# Patient Record
Sex: MALE | Race: WHITE | Employment: FULL TIME | URBAN - METROPOLITAN AREA
[De-identification: names, ages, dates, MRNs, and addresses within clinical notes are randomized per-mention and may not be internally consistent; named-entity substitution may affect disease eponyms.]

---
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
## 2018-03-21 ENCOUNTER — OFFICE VISIT (OUTPATIENT)
Dept: FAMILY MEDICINE CLINIC | Facility: CLINIC | Age: 18
End: 2018-03-21

## 2018-03-21 VITALS
BODY MASS INDEX: 32.49 KG/M2 | HEIGHT: 67 IN | WEIGHT: 207 LBS | DIASTOLIC BLOOD PRESSURE: 72 MMHG | SYSTOLIC BLOOD PRESSURE: 151 MMHG | HEART RATE: 74 BPM | TEMPERATURE: 98 F

## 2018-03-21 DIAGNOSIS — F12.90 MARIJUANA USE: ICD-10-CM

## 2018-03-21 DIAGNOSIS — R03.0 ELEVATED BLOOD PRESSURE READING IN OFFICE WITHOUT DIAGNOSIS OF HYPERTENSION: ICD-10-CM

## 2018-03-21 DIAGNOSIS — J06.9 VIRAL UPPER RESPIRATORY TRACT INFECTION: Primary | ICD-10-CM

## 2018-03-21 PROCEDURE — 99214 OFFICE O/P EST MOD 30 MIN: CPT | Performed by: NURSE PRACTITIONER

## 2018-03-21 NOTE — PROGRESS NOTES
HPI  Pt here for cough the other day with large mucous plug. Had some sob and wheezing. Does not have h/o asthma. Smokes marijuana regularly. No cough, congestion, headache, rhinorrhea.    State he got a speeding ticket on way to office  Review of Systems Unknown  Penicillins             Unknown    Physical Exam   Constitutional: He is oriented to person, place, and time. He appears well-developed and well-nourished. No distress. HENT:   Head: Normocephalic and atraumatic.    Right Ear: Hearing, tympanic m disease and cancer  3. Advise to drive safely, within limits and if he is going to be late to an appt, to call rather than speed        Discussed plan of care with pt and pt is in agreement. All questions answered. Pt to call with questions or concerns.

## 2018-03-24 ENCOUNTER — CHARTING TRANS (OUTPATIENT)
Dept: OTHER | Age: 18
End: 2018-03-24

## 2018-03-31 ENCOUNTER — NURSE TRIAGE (OUTPATIENT)
Dept: OTHER | Age: 18
End: 2018-03-31

## 2018-03-31 NOTE — TELEPHONE ENCOUNTER
Action Requested: Summary for Provider     []  Critical Lab, Recommendations Needed  [] Need Additional Advice  []   FYI    []   Need Orders  [] Need Medications Sent to Pharmacy  []  Other     SUMMARY: Received call from patient who reports he went to the

## 2018-04-01 ENCOUNTER — HOSPITAL (OUTPATIENT)
Dept: OTHER | Age: 18
End: 2018-04-01
Attending: EMERGENCY MEDICINE

## 2018-04-01 LAB
ALBUMIN SERPL-MCNC: 4.4 GM/DL (ref 3.6–5.1)
ALBUMIN/GLOB SERPL: 1.2 {RATIO} (ref 1–2.4)
ALP SERPL-CCNC: 135 UNIT/L (ref 55–220)
ALT SERPL-CCNC: 31 UNIT/L
AMORPH SED URNS QL MICRO: ABNORMAL
ANALYZER ANC (IANC): NORMAL
ANION GAP SERPL CALC-SCNC: 10 MMOL/L (ref 10–20)
APPEARANCE UR: ABNORMAL
AST SERPL-CCNC: 16 UNIT/L
BACTERIA #/AREA URNS HPF: ABNORMAL /HPF
BASOPHILS # BLD: 0 THOUSAND/MCL (ref 0–0.3)
BASOPHILS NFR BLD: 0 %
BILIRUB SERPL-MCNC: 0.5 MG/DL (ref 0.2–1)
BILIRUB UR QL: NEGATIVE
BUN SERPL-MCNC: 11 MG/DL (ref 6–20)
BUN/CREAT SERPL: 13 (ref 7–25)
CALCIUM SERPL-MCNC: 9.6 MG/DL (ref 8.4–10.2)
CAOX CRY URNS QL MICRO: ABNORMAL
CHLORIDE: 104 MMOL/L (ref 98–107)
CK SERPL-CCNC: 144 UNIT/L (ref 39–308)
CO2 SERPL-SCNC: 28 MMOL/L (ref 21–32)
COLOR UR: YELLOW
CREAT SERPL-MCNC: 0.84 MG/DL (ref 0.67–1.17)
DIFFERENTIAL METHOD BLD: NORMAL
EOSINOPHIL # BLD: 0.2 THOUSAND/MCL (ref 0.1–0.5)
EOSINOPHIL NFR BLD: 3 %
EPITH CASTS #/AREA URNS LPF: ABNORMAL /[LPF]
ERYTHROCYTE [DISTWIDTH] IN BLOOD: 11.8 % (ref 11–15)
FATTY CASTS #/AREA URNS LPF: ABNORMAL /[LPF]
GLOBULIN SER-MCNC: 3.8 GM/DL (ref 2–4)
GLUCOSE SERPL-MCNC: 91 MG/DL (ref 65–99)
GLUCOSE UR-MCNC: NEGATIVE MG/DL
GRAN CASTS #/AREA URNS LPF: ABNORMAL /[LPF]
HEMATOCRIT: 44 % (ref 39–51)
HGB BLD-MCNC: 15.8 GM/DL (ref 13–17)
HGB UR QL: NEGATIVE
HYALINE CASTS #/AREA URNS LPF: ABNORMAL /LPF (ref 0–5)
KETONES UR-MCNC: NEGATIVE MG/DL
LEUKOCYTE ESTERASE UR QL STRIP: NEGATIVE
LYMPHOCYTES # BLD: 2.3 THOUSAND/MCL (ref 1.2–5.2)
LYMPHOCYTES NFR BLD: 33 %
MCH RBC QN AUTO: 30.7 PG (ref 26–34)
MCHC RBC AUTO-ENTMCNC: 35.9 GM/DL (ref 32–36.5)
MCV RBC AUTO: 85.6 FL (ref 78–100)
MIXED CELL CASTS #/AREA URNS LPF: ABNORMAL /[LPF]
MONOCYTES # BLD: 0.5 THOUSAND/MCL (ref 0.3–0.9)
MONOCYTES NFR BLD: 7 %
MUCOUS THREADS URNS QL MICRO: PRESENT
NEUTROPHILS # BLD: 4.1 THOUSAND/MCL (ref 1.8–8)
NEUTROPHILS NFR BLD: 57 %
NEUTS SEG NFR BLD: NORMAL %
NITRITE UR QL: NEGATIVE
PERCENT NRBC: NORMAL
PH UR: 8 UNIT (ref 5–7)
PLATELET # BLD: 284 THOUSAND/MCL (ref 140–450)
POTASSIUM SERPL-SCNC: 4.1 MMOL/L (ref 3.4–5.1)
PROT SERPL-MCNC: 8.2 GM/DL (ref 6.4–8.2)
PROT UR QL: NEGATIVE MG/DL
RBC # BLD: 5.14 MILLION/MCL (ref 4.5–5.9)
RBC #/AREA URNS HPF: ABNORMAL /HPF (ref 0–3)
RBC CASTS #/AREA URNS LPF: ABNORMAL /[LPF]
RENAL EPI CELLS #/AREA URNS HPF: ABNORMAL /[HPF]
SODIUM SERPL-SCNC: 138 MMOL/L (ref 135–145)
SP GR UR: 1.01 (ref 1–1.03)
SPECIMEN SOURCE: ABNORMAL
SPERM URNS QL MICRO: ABNORMAL
SQUAMOUS #/AREA URNS HPF: ABNORMAL /HPF (ref 0–5)
T VAGINALIS URNS QL MICRO: ABNORMAL
TRI-PHOS CRY URNS QL MICRO: ABNORMAL
URATE CRY URNS QL MICRO: ABNORMAL
URINE REFLEX: ABNORMAL
URNS CMNT MICRO: ABNORMAL
UROBILINOGEN UR QL: 0.2 MG/DL (ref 0–1)
WAXY CASTS #/AREA URNS LPF: ABNORMAL /[LPF]
WBC # BLD: 7.2 THOUSAND/MCL (ref 4.2–11)
WBC #/AREA URNS HPF: ABNORMAL /HPF (ref 0–5)
WBC CASTS #/AREA URNS LPF: ABNORMAL /[LPF]
YEAST HYPHAE URNS QL MICRO: ABNORMAL
YEAST URNS QL MICRO: ABNORMAL

## 2018-04-01 NOTE — TELEPHONE ENCOUNTER
Have patient follow-up with me this coming week with regard to the calf pain if it is continuing. If there is any emergency room records and he can bring a log, especially labs and diagnostic tests that would be very helpful.

## 2018-04-03 ENCOUNTER — OFFICE VISIT (OUTPATIENT)
Dept: FAMILY MEDICINE CLINIC | Facility: CLINIC | Age: 18
End: 2018-04-03

## 2018-04-03 ENCOUNTER — TELEPHONE (OUTPATIENT)
Dept: FAMILY MEDICINE CLINIC | Facility: CLINIC | Age: 18
End: 2018-04-03

## 2018-04-03 VITALS
BODY MASS INDEX: 30.76 KG/M2 | SYSTOLIC BLOOD PRESSURE: 120 MMHG | DIASTOLIC BLOOD PRESSURE: 77 MMHG | TEMPERATURE: 98 F | HEIGHT: 67 IN | HEART RATE: 85 BPM | RESPIRATION RATE: 18 BRPM | WEIGHT: 196 LBS

## 2018-04-03 DIAGNOSIS — I80.02 SUPERFICIAL PHLEBITIS OF LEFT LEG: ICD-10-CM

## 2018-04-03 DIAGNOSIS — R04.2 HEMOPTYSIS: ICD-10-CM

## 2018-04-03 DIAGNOSIS — F41.0 PANIC ATTACKS: ICD-10-CM

## 2018-04-03 DIAGNOSIS — S86.812A STRAIN OF CALF MUSCLE, LEFT, INITIAL ENCOUNTER: Primary | ICD-10-CM

## 2018-04-03 DIAGNOSIS — F12.10 MARIJUANA ABUSE: ICD-10-CM

## 2018-04-03 DIAGNOSIS — R79.89 POSITIVE D DIMER: ICD-10-CM

## 2018-04-03 DIAGNOSIS — M79.605 LEFT LEG PAIN: ICD-10-CM

## 2018-04-03 DIAGNOSIS — F41.9 ANXIETY: ICD-10-CM

## 2018-04-03 DIAGNOSIS — R00.2 HEART PALPITATIONS: ICD-10-CM

## 2018-04-03 PROCEDURE — 99215 OFFICE O/P EST HI 40 MIN: CPT | Performed by: FAMILY MEDICINE

## 2018-04-03 PROCEDURE — 99212 OFFICE O/P EST SF 10 MIN: CPT | Performed by: FAMILY MEDICINE

## 2018-04-03 RX ORDER — NAPROXEN 500 MG/1
500 TABLET ORAL 2 TIMES DAILY WITH MEALS
Qty: 60 TABLET | Refills: 0 | Status: SHIPPED | OUTPATIENT
Start: 2018-04-03 | End: 2018-05-12

## 2018-04-03 RX ORDER — AZITHROMYCIN 1 G
1 PACKET (EA) ORAL
COMMUNITY
Start: 2018-04-03 | End: 2018-04-03

## 2018-04-03 RX ORDER — LORAZEPAM 1 MG/1
1 TABLET ORAL
COMMUNITY
Start: 2018-04-03 | End: 2018-05-12 | Stop reason: ALTCHOICE

## 2018-04-03 RX ORDER — LORAZEPAM 0.5 MG/1
0.5 TABLET ORAL 2 TIMES DAILY PRN
Qty: 30 TABLET | Refills: 0 | Status: SHIPPED | OUTPATIENT
Start: 2018-04-03 | End: 2018-05-12 | Stop reason: ALTCHOICE

## 2018-04-03 NOTE — PROGRESS NOTES
Patient ID: Maura Mathur is a 21year old male. HPI  Patient presents with:  ER F/U: Patient was in ER earlier today for left calf pain and coughed up blood. Pain 5/10    He has seen my nurse practitioner Diandra Lala on 3/21/2018.   He was smoking marijuan He has been in the ER 4 times in the last 5 days. His mother is here with him today. She was on vacation. She went to the emergency room with him earlier today.   She insisted on then doing lab work along with a CAT scan of his chest.  Patient is lucasibl Mom brought paperwork from many of the hospitalizations but especially the one from today which has numerous labs and diagnostic test done. His d-dimer was elevated but they did do a CAT scan of the chest showed no pulmonary embolus.   Initially when he we 08/26/16 : 234 lb (106.1 kg) (99 %, Z= 2.21)*    * Growth percentiles are based on CDC 2-20 Years data. Body mass index is 30.7 kg/m².     BP Readings from Last 6 Encounters:  04/03/18 : 120/77  03/21/18 : 151/72  10/19/17 : 135/83  07/25/17 : 123/75  03 Psychiatry: He is clearly anxious. He is very paranoid about the left calf pain and keeps pointing at it and stating this is the cause of his issues. He has poor judgment and insight. He seems to be in denial about his anxiety and panic attacks.   He has They did bring a baggy and it does clearly show red blood that he coughed up. He did have a CAT scan though which was negative. He is only 21years of age and I do not think anyone is going to do a bronchoscopy on him at this time.   He will continue to m Follow up if symptoms persist.  Take medicine (if given) as prescribed. Approach to treatment discussed and patient/family member understands and agrees to plan. Return in about 2 weeks (around 4/17/2018).       [address] [place] [address], DO  4/3/2018

## 2018-04-04 NOTE — TELEPHONE ENCOUNTER
Inform him or his mother  that I want him to do a wet washcloth with warm water on it on the left calf where his vein is showing. It may reduce the inflammation and discomfort that he is having. He can do this for 5-10 minutes twice daily.   Make sure he

## 2018-04-05 ENCOUNTER — HOSPITAL (OUTPATIENT)
Dept: OTHER | Age: 18
End: 2018-04-05
Attending: EMERGENCY MEDICINE

## 2018-04-05 ENCOUNTER — DIAGNOSTIC TRANS (OUTPATIENT)
Dept: OTHER | Age: 18
End: 2018-04-05

## 2018-04-05 LAB
ALBUMIN SERPL-MCNC: 4.5 GM/DL (ref 3.6–5.1)
ALBUMIN/GLOB SERPL: 1.4 {RATIO} (ref 1–2.4)
ALP SERPL-CCNC: 122 UNIT/L (ref 55–220)
ALT SERPL-CCNC: 31 UNIT/L
AMPHETAMINES UR QL SCN>500 NG/ML: NEGATIVE
ANALYZER ANC (IANC): ABNORMAL
ANION GAP SERPL CALC-SCNC: 9 MMOL/L (ref 10–20)
AST SERPL-CCNC: 17 UNIT/L
BARBITURATES UR QL SCN>200 NG/ML: NEGATIVE
BASOPHILS # BLD: 0 THOUSAND/MCL (ref 0–0.3)
BASOPHILS NFR BLD: 0 %
BENZODIAZ UR QL SCN>200 NG/ML: NEGATIVE
BILIRUB SERPL-MCNC: 0.4 MG/DL (ref 0.2–1)
BUN SERPL-MCNC: 14 MG/DL (ref 6–20)
BUN/CREAT SERPL: 16 (ref 7–25)
BZE UR QL SCN>150 NG/ML: NEGATIVE
CALCIUM SERPL-MCNC: 9.3 MG/DL (ref 8.4–10.2)
CANNABINOIDS UR QL SCN>50 NG/ML: POSITIVE
CHLORIDE: 106 MMOL/L (ref 98–107)
CK SERPL-CCNC: 186 UNIT/L (ref 39–308)
CO2 SERPL-SCNC: 28 MMOL/L (ref 21–32)
CREAT SERPL-MCNC: 0.85 MG/DL (ref 0.67–1.17)
DIFFERENTIAL METHOD BLD: ABNORMAL
EOSINOPHIL # BLD: 0.1 THOUSAND/MCL (ref 0.1–0.5)
EOSINOPHIL NFR BLD: 2 %
ERYTHROCYTE [DISTWIDTH] IN BLOOD: 11.9 % (ref 11–15)
ETHANOL SERPL-MCNC: NORMAL MG/DL
GLOBULIN SER-MCNC: 3.3 GM/DL (ref 2–4)
GLUCOSE SERPL-MCNC: 98 MG/DL (ref 65–99)
HEMATOCRIT: 41.7 % (ref 39–51)
HGB BLD-MCNC: 15.4 GM/DL (ref 13–17)
LYMPHOCYTES # BLD: 3.7 THOUSAND/MCL (ref 1.2–5.2)
LYMPHOCYTES NFR BLD: 44 %
MCH RBC QN AUTO: 31.4 PG (ref 26–34)
MCHC RBC AUTO-ENTMCNC: 36.9 GM/DL (ref 32–36.5)
MCV RBC AUTO: 85.1 FL (ref 78–100)
MONOCYTES # BLD: 0.4 THOUSAND/MCL (ref 0.3–0.9)
MONOCYTES NFR BLD: 5 %
NEUTROPHILS # BLD: 4.2 THOUSAND/MCL (ref 1.8–8)
NEUTROPHILS NFR BLD: 49 %
NEUTS SEG NFR BLD: ABNORMAL %
OPIATES UR QL SCN>300 NG/ML: NEGATIVE
PCP UR QL SCN>25 NG/ML: NEGATIVE
PERCENT NRBC: ABNORMAL
PLATELET # BLD: 260 THOUSAND/MCL (ref 140–450)
POTASSIUM SERPL-SCNC: 3.9 MMOL/L (ref 3.4–5.1)
PROT SERPL-MCNC: 7.8 GM/DL (ref 6.4–8.2)
RBC # BLD: 4.9 MILLION/MCL (ref 4.5–5.9)
SODIUM SERPL-SCNC: 139 MMOL/L (ref 135–145)
TROPONIN I SERPL HS-MCNC: <0.02 NG/ML
WBC # BLD: 8.4 THOUSAND/MCL (ref 4.2–11)

## 2018-04-05 NOTE — TELEPHONE ENCOUNTER
FYI VS, again no answer and no VM set-up so unable to leave message at only number on file. Mailed no response letter.

## 2018-05-12 ENCOUNTER — OFFICE VISIT (OUTPATIENT)
Dept: FAMILY MEDICINE CLINIC | Facility: CLINIC | Age: 18
End: 2018-05-12

## 2018-05-12 VITALS
BODY MASS INDEX: 31 KG/M2 | HEART RATE: 62 BPM | TEMPERATURE: 97 F | DIASTOLIC BLOOD PRESSURE: 78 MMHG | SYSTOLIC BLOOD PRESSURE: 128 MMHG | WEIGHT: 201 LBS

## 2018-05-12 DIAGNOSIS — M79.605 LEFT LEG PAIN: ICD-10-CM

## 2018-05-12 DIAGNOSIS — F41.9 ANXIETY: ICD-10-CM

## 2018-05-12 DIAGNOSIS — S86.812A STRAIN OF CALF MUSCLE, LEFT, INITIAL ENCOUNTER: ICD-10-CM

## 2018-05-12 DIAGNOSIS — I83.90 VARICOSE VEIN OF LEG: ICD-10-CM

## 2018-05-12 DIAGNOSIS — M79.662 PAIN OF LEFT CALF: Primary | ICD-10-CM

## 2018-05-12 DIAGNOSIS — F12.10 MARIJUANA ABUSE: ICD-10-CM

## 2018-05-12 PROCEDURE — 99215 OFFICE O/P EST HI 40 MIN: CPT | Performed by: FAMILY MEDICINE

## 2018-05-12 PROCEDURE — 99212 OFFICE O/P EST SF 10 MIN: CPT | Performed by: FAMILY MEDICINE

## 2018-05-12 RX ORDER — NAPROXEN 500 MG/1
500 TABLET ORAL 2 TIMES DAILY WITH MEALS
Qty: 60 TABLET | Refills: 0 | Status: SHIPPED | OUTPATIENT
Start: 2018-05-12 | End: 2018-06-02

## 2018-05-12 NOTE — PROGRESS NOTES
Patient ID: Meggan Hedrick is a 21year old male. HPI  Patient presents with:  Leg Pain: left leg    I had seen him last time for the same issue. He had multiple emergency room visits for this left leg pain.     Still complains of the pain about every Encounters:  05/12/18 : 128/78  04/03/18 : 120/77  03/21/18 : 151/72  10/19/17 : 135/83  07/25/17 : 123/75  03/02/17 : 134/82        Review of Systems   Constitutional: Negative for appetite change, chills, diaphoresis and fever.    Respiratory: Negative fo ASSESSMENT/PLAN:     Diagnoses and all orders for this visit:    Pain of left calf  -     MRI LOWER LEG, LEFT (CPT=73718); Future  -     naproxen 500 MG Oral Tab; Take 1 tablet (500 mg total) by mouth 2 (two) times daily with meals.  Take with meals (for

## 2018-06-18 ENCOUNTER — HOSPITAL (OUTPATIENT)
Dept: OTHER | Age: 18
End: 2018-06-18
Attending: EMERGENCY MEDICINE

## 2018-06-18 ENCOUNTER — NURSE TRIAGE (OUTPATIENT)
Dept: FAMILY MEDICINE CLINIC | Facility: CLINIC | Age: 18
End: 2018-06-18

## 2018-06-18 NOTE — TELEPHONE ENCOUNTER
Action Requested: Summary for Provider     []  Critical Lab, Recommendations Needed  [] Need Additional Advice  []   FYI    []   Need Orders  [] Need Medications Sent to Pharmacy  []  Other     SUMMARY: Per protocol advised assessment now but no openings

## 2018-06-20 ENCOUNTER — HOSPITAL ENCOUNTER (OUTPATIENT)
Dept: GENERAL RADIOLOGY | Age: 18
Discharge: HOME | End: 2018-06-20
Attending: RADIOLOGY
Payer: MEDICAID

## 2018-06-20 ENCOUNTER — HOSPITAL ENCOUNTER (OUTPATIENT)
Dept: MRI IMAGING | Age: 18
Discharge: HOME | End: 2018-06-20
Attending: FAMILY MEDICINE
Payer: MEDICAID

## 2018-06-20 DIAGNOSIS — M79.662 PAIN OF LEFT CALF: ICD-10-CM

## 2018-06-20 DIAGNOSIS — I83.90 VARICOSE VEIN OF LEG: ICD-10-CM

## 2018-06-20 PROCEDURE — 73718 MRI LOWER EXTREMITY W/O DYE: CPT | Performed by: FAMILY MEDICINE

## 2018-06-25 ENCOUNTER — NURSE TRIAGE (OUTPATIENT)
Dept: OTHER | Age: 18
End: 2018-06-25

## 2018-06-25 NOTE — TELEPHONE ENCOUNTER
Action Requested: Summary for Provider     []  Critical Lab, Recommendations Needed  [] Need Additional Advice  [x]   FYI    []   Need Orders  [] Need Medications Sent to Pharmacy  []  Other     SUMMARY: Patient calling with complaint of neck pain/neck lum

## 2018-06-29 ENCOUNTER — HOSPITAL ENCOUNTER (OUTPATIENT)
Dept: GENERAL RADIOLOGY | Age: 18
Discharge: HOME | End: 2018-06-29
Attending: FAMILY MEDICINE
Payer: MEDICAID

## 2018-06-29 ENCOUNTER — LAB ENCOUNTER (OUTPATIENT)
Dept: LAB | Age: 18
End: 2018-06-29
Attending: FAMILY MEDICINE
Payer: MEDICAID

## 2018-06-29 ENCOUNTER — OFFICE VISIT (OUTPATIENT)
Dept: FAMILY MEDICINE CLINIC | Facility: CLINIC | Age: 18
End: 2018-06-29

## 2018-06-29 VITALS
HEIGHT: 67 IN | WEIGHT: 202 LBS | DIASTOLIC BLOOD PRESSURE: 82 MMHG | TEMPERATURE: 98 F | HEART RATE: 67 BPM | SYSTOLIC BLOOD PRESSURE: 130 MMHG | BODY MASS INDEX: 31.71 KG/M2

## 2018-06-29 DIAGNOSIS — R59.9 SWELLING OF LYMPH NODE: Primary | ICD-10-CM

## 2018-06-29 DIAGNOSIS — R59.9 SWELLING OF LYMPH NODE: ICD-10-CM

## 2018-06-29 DIAGNOSIS — R61 NIGHT SWEATS: ICD-10-CM

## 2018-06-29 DIAGNOSIS — F41.9 ANXIETY: ICD-10-CM

## 2018-06-29 DIAGNOSIS — H69.82 DYSFUNCTION OF LEFT EUSTACHIAN TUBE: ICD-10-CM

## 2018-06-29 DIAGNOSIS — J30.89 OTHER ALLERGIC RHINITIS: ICD-10-CM

## 2018-06-29 LAB
BASOPHILS # BLD: 0.1 K/UL (ref 0–0.2)
BASOPHILS NFR BLD: 1 %
EOSINOPHIL # BLD: 0.3 K/UL (ref 0–0.7)
EOSINOPHIL NFR BLD: 3 %
ERYTHROCYTE [DISTWIDTH] IN BLOOD BY AUTOMATED COUNT: 12.6 % (ref 11–15)
HCT VFR BLD AUTO: 42.1 % (ref 41–52)
HGB BLD-MCNC: 14.5 G/DL (ref 13.5–17.5)
LYMPHOCYTES # BLD: 2.9 K/UL (ref 1–4)
LYMPHOCYTES NFR BLD: 38 %
MCH RBC QN AUTO: 29.8 PG (ref 27–32)
MCHC RBC AUTO-ENTMCNC: 34.3 G/DL (ref 32–37)
MCV RBC AUTO: 86.8 FL (ref 80–100)
MONOCYTES # BLD: 0.6 K/UL (ref 0–1)
MONOCYTES NFR BLD: 8 %
NEUTROPHILS # BLD AUTO: 3.8 K/UL (ref 1.8–7.7)
NEUTROPHILS NFR BLD: 50 %
PLATELET # BLD AUTO: 255 K/UL (ref 140–400)
PMV BLD AUTO: 7.6 FL (ref 7.4–10.3)
RBC # BLD AUTO: 4.85 M/UL (ref 4.5–5.9)
WBC # BLD AUTO: 7.6 K/UL (ref 4–11)

## 2018-06-29 PROCEDURE — 99214 OFFICE O/P EST MOD 30 MIN: CPT | Performed by: FAMILY MEDICINE

## 2018-06-29 PROCEDURE — 99212 OFFICE O/P EST SF 10 MIN: CPT | Performed by: FAMILY MEDICINE

## 2018-06-29 PROCEDURE — 85025 COMPLETE CBC W/AUTO DIFF WBC: CPT

## 2018-06-29 PROCEDURE — 71046 X-RAY EXAM CHEST 2 VIEWS: CPT | Performed by: FAMILY MEDICINE

## 2018-06-29 PROCEDURE — 36415 COLL VENOUS BLD VENIPUNCTURE: CPT

## 2018-06-29 RX ORDER — FLUTICASONE PROPIONATE 50 MCG
2 SPRAY, SUSPENSION (ML) NASAL DAILY
Qty: 3 BOTTLE | Refills: 3 | Status: SHIPPED | OUTPATIENT
Start: 2018-06-29 | End: 2019-06-24

## 2018-06-29 NOTE — PROGRESS NOTES
Patient ID: Baudilio Rose is a 19year old male. HPI  Patient presents with:  Lump: neck - follow-up    He is here with his brother. He states he noticed this on June 8, 2018. He remembers this because it was 2 days prior to his birthday.   He got o fracture 2011    right-casting       Past Surgical History:  2011: ADENOIDECTOMY  2011: TONSILLECTOMY       Current Outpatient Prescriptions:  Acetaminophen (TYLENOL) 325 MG Oral Cap Take by mouth.  Disp:  Rfl:      Allergies:  Amoxicillin             Leobardo Lightning Flonase and fexofenadine as directed. I see no infection whatsoever. Other allergic rhinitis  -     Fluticasone Propionate 50 MCG/ACT Nasal Suspension; 2 sprays by Each Nare route daily.     Dysfunction of left eustachian tube  -     Fluticasone Propionat

## 2018-06-29 NOTE — PATIENT INSTRUCTIONS
GENERIC ALLEGRA 180 mg    Get over-the-counter Allegra 180 mg but just get the generic which is Fexofenadine 180 mg and take daily. It does not make you tired.   Can take at the same time you take you

## 2018-11-01 VITALS — RESPIRATION RATE: 18 BRPM | HEART RATE: 80 BPM | OXYGEN SATURATION: 96 % | TEMPERATURE: 98.2 F

## 2020-02-08 NOTE — TELEPHONE ENCOUNTER
Closing encounter as there have been 3 unsuccessful attempts to reach parent with response. Protocol is to close encounter after 2 unsuccessful attempts with no call back.
[facility] ext [phone] today or [phone]. Thank you.
[facility], patients BMI as of 03-02-17 is 34.72%
Pt mother is calling want to know pt current BMI  Mother is requesting a call back
[facility]    Please transfer x [phone] Ce Tate RN) until 1pm or [phone] anytime. Thank you.
negative...

## 2024-08-30 ENCOUNTER — TELEPHONE (OUTPATIENT)
Dept: FAMILY MEDICINE CLINIC | Facility: CLINIC | Age: 24
End: 2024-08-30

## 2024-08-30 NOTE — TELEPHONE ENCOUNTER
Patients mom called stating the patient and his family have seen  for 20 years and wanted to provide an update for  and also request medical records for the patient (transferred call to medical records). 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 Patient had emergency surgery and during his surgery he had three seizures and possibly a stroke. She stated he is currently in the ICU.     [facility]     Phone number: [phone]        Fax#: [phone]

## 2024-08-31 NOTE — TELEPHONE ENCOUNTER
That is terrible to hear.  Agree with sending her to medical records.  I will send her a MyChart message on her chart as I have known her and her boys for years.

## (undated) NOTE — LETTER
4/3/2018              Az Trujillo        [address] [address]         To Whom it may concern: This is to certify that Az Trujillo had an appointment on 4/3/2018 at 3:00 PM with   Dr. Indira Mejia DO.         Sincerely,

## (undated) NOTE — MR AVS SNAPSHOT
[address], [address]  [address] [facility]  [phone]               Thank you for choosing us for your health care visit with Yolie Perez DO.   We are glad to serve you and happy to provide you with this summary Commonly known as:  PIPPA           RaNITidine HCl 150 MG Caps   Take 1 capsule (150 mg total) by mouth 2 (two) times daily.    Commonly known as:  ZANTAC                Where to Get Your Medications      These medications were sent to [address] increments are effective and add up over the week   2 ½ hours per week – spread out over time Use a jacqueline to keep you motivated   Don’t forget strength training with weights and resistance Set goals and track your progress   You don’t need to join a gym.

## (undated) NOTE — LETTER
4/5/2018              Abhijit Rose        [address] [address]         Dear Nina Hernández,    This letter is to inform you that our office has made several attempts to reach you by phone without success.   We were attempting to contact

## (undated) NOTE — LETTER
10/19/2017          To Whom It May Concern:    Qi Delgado is currently under my medical care and may not return to work at this time. Please excuse Tanner Buck for 1 days. He may return to work on 10-20-17. Activity is restricted as follows: none.